# Patient Record
Sex: FEMALE | Race: WHITE | Employment: UNEMPLOYED | URBAN - METROPOLITAN AREA
[De-identification: names, ages, dates, MRNs, and addresses within clinical notes are randomized per-mention and may not be internally consistent; named-entity substitution may affect disease eponyms.]

---

## 2020-01-22 ENCOUNTER — HOSPITAL ENCOUNTER (OUTPATIENT)
Age: 55
Setting detail: SPECIMEN
Discharge: HOME | End: 2020-01-22
Payer: COMMERCIAL

## 2020-01-22 LAB
ALBUMIN SERPL-MCNC: 4.4 G/DL (ref 3.5–5.2)
ALBUMIN/GLOBULIN RATIO: 1.3 (ref 1–2.5)
ALP BLD-CCNC: 64 U/L (ref 35–104)
ALT SERPL-CCNC: 20 U/L (ref 5–33)
ANION GAP SERPL CALCULATED.3IONS-SCNC: 17 MMOL/L (ref 9–17)
AST SERPL-CCNC: 23 U/L
BILIRUB SERPL-MCNC: 0.22 MG/DL (ref 0.3–1.2)
BUN BLDV-MCNC: 19 MG/DL (ref 6–20)
BUN/CREAT BLD: ABNORMAL (ref 9–20)
CALCIUM SERPL-MCNC: 9.6 MG/DL (ref 8.6–10.4)
CHLORIDE BLD-SCNC: 100 MMOL/L (ref 98–107)
CHOLESTEROL, FASTING: 252 MG/DL
CHOLESTEROL/HDL RATIO: 4.1
CO2: 23 MMOL/L (ref 20–31)
CREAT SERPL-MCNC: 0.77 MG/DL (ref 0.5–0.9)
GFR AFRICAN AMERICAN: >60 ML/MIN
GFR NON-AFRICAN AMERICAN: >60 ML/MIN
GFR SERPL CREATININE-BSD FRML MDRD: ABNORMAL ML/MIN/{1.73_M2}
GFR SERPL CREATININE-BSD FRML MDRD: ABNORMAL ML/MIN/{1.73_M2}
GLUCOSE FASTING: 73 MG/DL (ref 70–99)
HDLC SERPL-MCNC: 62 MG/DL
LDL CHOLESTEROL: 164 MG/DL (ref 0–130)
POTASSIUM SERPL-SCNC: 4.2 MMOL/L (ref 3.7–5.3)
SODIUM BLD-SCNC: 140 MMOL/L (ref 135–144)
THYROXINE, FREE: 1.13 NG/DL (ref 0.93–1.7)
TOTAL PROTEIN: 7.7 G/DL (ref 6.4–8.3)
TRIGLYCERIDE, FASTING: 132 MG/DL
TSH SERPL DL<=0.05 MIU/L-ACNC: 2.97 MIU/L (ref 0.3–5)
VITAMIN D 25-HYDROXY: 32.9 NG/ML (ref 30–100)
VLDLC SERPL CALC-MCNC: ABNORMAL MG/DL (ref 1–30)

## 2023-04-15 ENCOUNTER — HOSPITAL ENCOUNTER (EMERGENCY)
Facility: CLINIC | Age: 58
Discharge: HOME | End: 2023-04-15
Attending: EMERGENCY MEDICINE
Payer: COMMERCIAL

## 2023-04-15 VITALS
HEIGHT: 65 IN | BODY MASS INDEX: 23.32 KG/M2 | SYSTOLIC BLOOD PRESSURE: 159 MMHG | WEIGHT: 140 LBS | TEMPERATURE: 98.2 F | RESPIRATION RATE: 16 BRPM | DIASTOLIC BLOOD PRESSURE: 88 MMHG | OXYGEN SATURATION: 97 % | HEART RATE: 75 BPM

## 2023-04-15 DIAGNOSIS — S61.412A LACERATION OF LEFT HAND WITHOUT FOREIGN BODY, INITIAL ENCOUNTER: Primary | ICD-10-CM

## 2023-04-15 PROCEDURE — 99282 EMERGENCY DEPT VISIT SF MDM: CPT

## 2023-04-15 PROCEDURE — 12001 RPR S/N/AX/GEN/TRNK 2.5CM/<: CPT

## 2023-04-15 ASSESSMENT — PAIN - FUNCTIONAL ASSESSMENT: PAIN_FUNCTIONAL_ASSESSMENT: NONE - DENIES PAIN

## 2023-12-18 ENCOUNTER — HOSPITAL ENCOUNTER (EMERGENCY)
Facility: CLINIC | Age: 58
Discharge: ANOTHER HOSPITAL | End: 2023-12-18
Attending: EMERGENCY MEDICINE
Payer: COMMERCIAL

## 2023-12-18 ENCOUNTER — APPOINTMENT (OUTPATIENT)
Dept: GENERAL RADIOLOGY | Facility: CLINIC | Age: 58
End: 2023-12-18
Payer: COMMERCIAL

## 2023-12-18 VITALS
HEART RATE: 93 BPM | RESPIRATION RATE: 19 BRPM | BODY MASS INDEX: 23.32 KG/M2 | SYSTOLIC BLOOD PRESSURE: 135 MMHG | DIASTOLIC BLOOD PRESSURE: 87 MMHG | OXYGEN SATURATION: 95 % | WEIGHT: 140 LBS | HEIGHT: 65 IN | TEMPERATURE: 97.6 F

## 2023-12-18 DIAGNOSIS — I21.4 NSTEMI (NON-ST ELEVATED MYOCARDIAL INFARCTION) (HCC): Primary | ICD-10-CM

## 2023-12-18 LAB
ALBUMIN SERPL-MCNC: 4.7 G/DL (ref 3.5–5.2)
ALBUMIN/GLOB SERPL: 1.5 {RATIO} (ref 1–2.5)
ALP SERPL-CCNC: 62 U/L (ref 35–104)
ALT SERPL-CCNC: 22 U/L (ref 5–33)
ANION GAP SERPL CALCULATED.3IONS-SCNC: 16 MMOL/L (ref 9–17)
AST SERPL-CCNC: 21 U/L
BASOPHILS # BLD: 0.1 K/UL (ref 0–0.2)
BASOPHILS NFR BLD: 1 % (ref 0–2)
BILIRUB SERPL-MCNC: 0.2 MG/DL (ref 0.3–1.2)
BUN SERPL-MCNC: 14 MG/DL (ref 6–20)
CALCIUM SERPL-MCNC: 9.8 MG/DL (ref 8.6–10.4)
CHLORIDE SERPL-SCNC: 102 MMOL/L (ref 98–107)
CO2 SERPL-SCNC: 21 MMOL/L (ref 20–31)
CREAT SERPL-MCNC: 0.7 MG/DL (ref 0.5–0.9)
D DIMER PPP FEU-MCNC: 0.24 UG/ML FEU
EOSINOPHIL # BLD: 0.2 K/UL (ref 0–0.4)
EOSINOPHILS RELATIVE PERCENT: 2 % (ref 1–4)
ERYTHROCYTE [DISTWIDTH] IN BLOOD BY AUTOMATED COUNT: 14.1 % (ref 12.5–15.4)
GFR SERPL CREATININE-BSD FRML MDRD: >60 ML/MIN/1.73M2
GLUCOSE SERPL-MCNC: 105 MG/DL (ref 70–99)
HCT VFR BLD AUTO: 37.4 % (ref 36–46)
HGB BLD-MCNC: 12.6 G/DL (ref 12–16)
LIPASE SERPL-CCNC: 44 U/L (ref 13–60)
LYMPHOCYTES NFR BLD: 3.2 K/UL (ref 1–4.8)
LYMPHOCYTES RELATIVE PERCENT: 39 % (ref 24–44)
MCH RBC QN AUTO: 28.5 PG (ref 26–34)
MCHC RBC AUTO-ENTMCNC: 33.6 G/DL (ref 31–37)
MCV RBC AUTO: 84.8 FL (ref 80–100)
MONOCYTES NFR BLD: 1 K/UL (ref 0.1–1.2)
MONOCYTES NFR BLD: 12 % (ref 2–11)
NEUTROPHILS NFR BLD: 46 % (ref 36–66)
NEUTS SEG NFR BLD: 3.7 K/UL (ref 1.8–7.7)
PARTIAL THROMBOPLASTIN TIME: 24.6 SEC (ref 21.3–31.3)
PLATELET # BLD AUTO: 330 K/UL (ref 140–450)
PMV BLD AUTO: 8.4 FL (ref 6–12)
POTASSIUM SERPL-SCNC: 3.1 MMOL/L (ref 3.7–5.3)
PROT SERPL-MCNC: 7.9 G/DL (ref 6.4–8.3)
RBC # BLD AUTO: 4.41 M/UL (ref 4–5.2)
SODIUM SERPL-SCNC: 139 MMOL/L (ref 135–144)
TROPONIN I SERPL HS-MCNC: 276 NG/L (ref 0–14)
TROPONIN I SERPL HS-MCNC: 397 NG/L (ref 0–14)
TROPONIN I SERPL HS-MCNC: 66 NG/L (ref 0–14)
WBC OTHER # BLD: 8.1 K/UL (ref 3.5–11)

## 2023-12-18 PROCEDURE — 83690 ASSAY OF LIPASE: CPT

## 2023-12-18 PROCEDURE — 85379 FIBRIN DEGRADATION QUANT: CPT

## 2023-12-18 PROCEDURE — A4216 STERILE WATER/SALINE, 10 ML: HCPCS

## 2023-12-18 PROCEDURE — 96365 THER/PROPH/DIAG IV INF INIT: CPT

## 2023-12-18 PROCEDURE — 6360000002 HC RX W HCPCS

## 2023-12-18 PROCEDURE — 6360000002 HC RX W HCPCS: Performed by: EMERGENCY MEDICINE

## 2023-12-18 PROCEDURE — 96375 TX/PRO/DX INJ NEW DRUG ADDON: CPT

## 2023-12-18 PROCEDURE — 96376 TX/PRO/DX INJ SAME DRUG ADON: CPT

## 2023-12-18 PROCEDURE — 71046 X-RAY EXAM CHEST 2 VIEWS: CPT

## 2023-12-18 PROCEDURE — 36415 COLL VENOUS BLD VENIPUNCTURE: CPT

## 2023-12-18 PROCEDURE — 84484 ASSAY OF TROPONIN QUANT: CPT

## 2023-12-18 PROCEDURE — 2500000003 HC RX 250 WO HCPCS

## 2023-12-18 PROCEDURE — 85025 COMPLETE CBC W/AUTO DIFF WBC: CPT

## 2023-12-18 PROCEDURE — 6370000000 HC RX 637 (ALT 250 FOR IP)

## 2023-12-18 PROCEDURE — 85730 THROMBOPLASTIN TIME PARTIAL: CPT

## 2023-12-18 PROCEDURE — 93005 ELECTROCARDIOGRAM TRACING: CPT

## 2023-12-18 PROCEDURE — 80053 COMPREHEN METABOLIC PANEL: CPT

## 2023-12-18 PROCEDURE — 2580000003 HC RX 258

## 2023-12-18 PROCEDURE — 96368 THER/DIAG CONCURRENT INF: CPT

## 2023-12-18 PROCEDURE — 99285 EMERGENCY DEPT VISIT HI MDM: CPT

## 2023-12-18 PROCEDURE — 96366 THER/PROPH/DIAG IV INF ADDON: CPT

## 2023-12-18 RX ORDER — PROCHLORPERAZINE EDISYLATE 5 MG/ML
10 INJECTION INTRAMUSCULAR; INTRAVENOUS ONCE
Status: COMPLETED | OUTPATIENT
Start: 2023-12-18 | End: 2023-12-18

## 2023-12-18 RX ORDER — ASPIRIN 81 MG/1
324 TABLET, CHEWABLE ORAL ONCE
Status: COMPLETED | OUTPATIENT
Start: 2023-12-18 | End: 2023-12-18

## 2023-12-18 RX ORDER — MORPHINE SULFATE 4 MG/ML
4 INJECTION, SOLUTION INTRAMUSCULAR; INTRAVENOUS ONCE
Status: COMPLETED | OUTPATIENT
Start: 2023-12-18 | End: 2023-12-18

## 2023-12-18 RX ORDER — ESTRADIOL 1 MG/1
1 TABLET ORAL DAILY
Status: ON HOLD | COMMUNITY
Start: 2023-12-10 | End: 2023-12-20 | Stop reason: HOSPADM

## 2023-12-18 RX ORDER — POTASSIUM CHLORIDE 20 MEQ/1
40 TABLET, EXTENDED RELEASE ORAL ONCE
Status: COMPLETED | OUTPATIENT
Start: 2023-12-18 | End: 2023-12-18

## 2023-12-18 RX ORDER — ONDANSETRON 2 MG/ML
4 INJECTION INTRAMUSCULAR; INTRAVENOUS ONCE
Status: COMPLETED | OUTPATIENT
Start: 2023-12-18 | End: 2023-12-18

## 2023-12-18 RX ORDER — ATORVASTATIN CALCIUM 20 MG/1
20 TABLET, FILM COATED ORAL DAILY
Status: ON HOLD | COMMUNITY
Start: 2023-12-10 | End: 2023-12-20 | Stop reason: HOSPADM

## 2023-12-18 RX ORDER — HEPARIN SODIUM 10000 [USP'U]/100ML
5-30 INJECTION, SOLUTION INTRAVENOUS CONTINUOUS
Status: DISCONTINUED | OUTPATIENT
Start: 2023-12-18 | End: 2023-12-18 | Stop reason: HOSPADM

## 2023-12-18 RX ORDER — HEPARIN SODIUM 1000 [USP'U]/ML
60 INJECTION, SOLUTION INTRAVENOUS; SUBCUTANEOUS ONCE
Status: COMPLETED | OUTPATIENT
Start: 2023-12-18 | End: 2023-12-18

## 2023-12-18 RX ORDER — NITROGLYCERIN 20 MG/100ML
5-200 INJECTION INTRAVENOUS CONTINUOUS
Status: DISCONTINUED | OUTPATIENT
Start: 2023-12-18 | End: 2023-12-18 | Stop reason: HOSPADM

## 2023-12-18 RX ORDER — NITROGLYCERIN 0.4 MG/1
0.4 TABLET SUBLINGUAL EVERY 5 MIN PRN
Status: DISCONTINUED | OUTPATIENT
Start: 2023-12-18 | End: 2023-12-18 | Stop reason: HOSPADM

## 2023-12-18 RX ORDER — ACETAMINOPHEN 500 MG
1000 TABLET ORAL ONCE
Status: COMPLETED | OUTPATIENT
Start: 2023-12-18 | End: 2023-12-18

## 2023-12-18 RX ADMIN — Medication 0.4 MG: at 19:51

## 2023-12-18 RX ADMIN — NITROGLYCERIN 5 MCG/MIN: 20 INJECTION INTRAVENOUS at 20:35

## 2023-12-18 RX ADMIN — ONDANSETRON 4 MG: 2 INJECTION INTRAMUSCULAR; INTRAVENOUS at 18:40

## 2023-12-18 RX ADMIN — ONDANSETRON 4 MG: 2 INJECTION INTRAMUSCULAR; INTRAVENOUS at 21:59

## 2023-12-18 RX ADMIN — ASPIRIN 81 MG CHEWABLE TABLET 324 MG: 81 TABLET CHEWABLE at 19:18

## 2023-12-18 RX ADMIN — MORPHINE SULFATE 4 MG: 4 INJECTION, SOLUTION INTRAMUSCULAR; INTRAVENOUS at 18:39

## 2023-12-18 RX ADMIN — HEPARIN SODIUM 12 UNITS/KG/HR: 10000 INJECTION, SOLUTION INTRAVENOUS at 20:48

## 2023-12-18 RX ADMIN — FAMOTIDINE 20 MG: 10 INJECTION, SOLUTION INTRAVENOUS at 18:40

## 2023-12-18 RX ADMIN — POTASSIUM CHLORIDE 40 MEQ: 1500 TABLET, EXTENDED RELEASE ORAL at 19:18

## 2023-12-18 RX ADMIN — MORPHINE SULFATE 4 MG: 4 INJECTION, SOLUTION INTRAMUSCULAR; INTRAVENOUS at 21:43

## 2023-12-18 RX ADMIN — HEPARIN SODIUM 3810 UNITS: 1000 INJECTION INTRAVENOUS; SUBCUTANEOUS at 20:49

## 2023-12-18 RX ADMIN — PROCHLORPERAZINE EDISYLATE 10 MG: 5 INJECTION INTRAMUSCULAR; INTRAVENOUS at 22:30

## 2023-12-18 RX ADMIN — ACETAMINOPHEN 1000 MG: 500 TABLET ORAL at 19:51

## 2023-12-18 ASSESSMENT — PAIN SCALES - GENERAL
PAINLEVEL_OUTOF10: 7
PAINLEVEL_OUTOF10: 4
PAINLEVEL_OUTOF10: 1

## 2023-12-18 ASSESSMENT — PAIN DESCRIPTION - FREQUENCY: FREQUENCY: CONTINUOUS

## 2023-12-18 ASSESSMENT — PAIN DESCRIPTION - PAIN TYPE
TYPE: ACUTE PAIN
TYPE: ACUTE PAIN

## 2023-12-18 ASSESSMENT — PAIN DESCRIPTION - ONSET: ONSET: ON-GOING

## 2023-12-18 ASSESSMENT — PAIN DESCRIPTION - LOCATION
LOCATION: CHEST
LOCATION: CHEST

## 2023-12-18 ASSESSMENT — PAIN DESCRIPTION - DESCRIPTORS: DESCRIPTORS: TIGHTNESS

## 2023-12-18 ASSESSMENT — LIFESTYLE VARIABLES
HOW MANY STANDARD DRINKS CONTAINING ALCOHOL DO YOU HAVE ON A TYPICAL DAY: PATIENT DOES NOT DRINK
HOW OFTEN DO YOU HAVE A DRINK CONTAINING ALCOHOL: NEVER

## 2023-12-18 ASSESSMENT — PAIN DESCRIPTION - ORIENTATION: ORIENTATION: MID

## 2023-12-18 ASSESSMENT — PAIN - FUNCTIONAL ASSESSMENT
PAIN_FUNCTIONAL_ASSESSMENT: 0-10
PAIN_FUNCTIONAL_ASSESSMENT: ACTIVITIES ARE NOT PREVENTED

## 2023-12-18 NOTE — ED PROVIDER NOTES
[facility] ED      Pt Name: Betsey Kate  MRN: [id number]  [address] [facility] [place] 1965  Date of evaluation: 12/18/2023    EMERGENCY DEPARTMENT ENCOUNTER      PERTINENT ATTENDING PHYSICIAN COMMENTS:      Faculty Attestation    I performed a history and physical examination of the patient and discussed management with the mid level provideer. I reviewed the mid level provider's note and agree with the documented findings and plan of care. Any areas of disagreement are noted on the chart. I was personally present for the key portions of any procedures. I have documented in the chart those procedures where I was not present during the key portions. I have reviewed the emergency nurses triage note. I agree with the chief complaint, past medical history, past surgical history, allergies, medications, social and family history as documented unless otherwise noted below. Documentation of the HPI, Physical Exam and Medical Decision Making performed by medical students or scribes is based on my personal performance of the HPI, PE and MDM. For Residents/Physician Assistant/ Nurse Practitioner cases/documentation I have personally evaluated this patient and have completed at least one if not all key elements of the E/M (history, physical exam, and MDM). Additional findings are as noted. CHIEF COMPLAINT       Chief Complaint   Patient presents with    Chest Pain    Dizziness       HISTORY OF PRESENT ILLNESS    Betsey Kate is a 61 y.o. female who presents to the emergency department with chest pressure and tightness with radiation to her left side of her neck and left upper chest starting 1 hour prior to arrival.  Rates it 4/10. No fevers chills cough or congestion. She describes it as an uncomfortable sensation but nothing more than that. She denies any recent travel. No known cardiac disease. No strong family history of cardiac disease. No other symptoms.   Patient was returning from a visit to her mom and had   -------------------------  BP (!) 184/102   Pulse 88   Temp 97.6 °F (36.4 °C) (Oral)   Resp 22   Ht 1.651 m (5' 5\")   Wt 63.5 kg (140 lb)   SpO2 100%   BMI 23.30 kg/m²     7:15 PM supervisory care will be transferred to Dr. Lala     CRITICAL CARE:    None    PROCEDURES:    See midlevel documentation      OARRS Report if indicated         DISPOSITION        (Please note that portions of this note were completed with a voice recognition program.  Efforts were made to edit the dictations but occasionally words are mis-transcribed.  Additionally, portions of this note may also include information that was incorporated after care transfer to another provider that were not available at the time of my evaluation.  Some of this information could likely include laboratory values, vital sign updates, medications etc.)    Edinson Machuca DO   Attending Emergency Physician       Edinson Machuca DO  12/18/23 8562

## 2023-12-18 NOTE — ED PROVIDER NOTES
[facility]  EMERGENCY DEPARTMENT ENCOUNTER      Pt Name: Brent Ness  MRN: [id number]  Birthdate 1965  Date of evaluation: 12/18/2023  Provider: ELISA Ramos CNP  9:34 PM    CHIEF COMPLAINT       Chief Complaint   Patient presents with    Chest Pain    Dizziness         HISTORY OF PRESENT ILLNESS    Brent Ness is a 57 y.o. female who presents to the emergency department with anterior chest pain x 1 hour     HPI  This is a 57-year-old female who presents with central chest pressure and tightness for the last hour.  She is also feeling anxious and shaking.  She denies any shortness of breath there is no abdominal pain, nausea vomiting or diarrhea.  She has not had a fever she has not been sick with a cough cold or congestion.  She does not have any history of heart problems.  Pain has been constant for an hour pain radiates up into her throat.  Patient denies any alcohol or drug use/abuse.    Nursing Notes were reviewed.    REVIEW OF SYSTEMS       Review of Systems   Constitutional:  Negative for activity change, chills, fatigue and fever.   HENT:  Negative for congestion, ear pain, rhinorrhea and sore throat.    Respiratory:  Negative for cough and shortness of breath.    Cardiovascular:  Positive for chest pain. Negative for leg swelling.   Gastrointestinal:  Negative for abdominal pain, diarrhea, nausea and vomiting.   Genitourinary:  Negative for difficulty urinating and pelvic pain.   Musculoskeletal:  Negative for arthralgias, joint swelling and myalgias.   Skin:  Negative for color change.   Neurological:  Negative for dizziness, numbness and headaches.   Psychiatric/Behavioral:  The patient is nervous/anxious.        Except as noted above the remainder of the review of systems was reviewed and negative.       PAST MEDICAL HISTORY   History reviewed. No pertinent past medical history.      SURGICAL HISTORY       Past Surgical History:   Procedure Laterality Date     this dictation. EMERGENCY DEPARTMENT COURSE and DIFFERENTIAL DIAGNOSIS/MDM:   Vitals:    Vitals:    12/18/23 2023 12/18/23 2055 12/18/23 2110 12/18/23 2128   BP: (!) 161/113 (!) 153/96 (!) 143/95 (!) 149/99   Pulse: 87 90 78 81   Resp: 21 16 17 17   Temp:       TempSrc:       SpO2: 99% 99% 96% 99%   Weight:       Height:               Medical Decision Making  Differential diagnosis: STEMI, NSTEMI, pulmonary embolism, anxiety    Patient is anxious and tearful on arrival she is having severe anterior chest pain that radiates to her neck. IV was established labs were obtained she has noted to have hypokalemia of 3.1 she was given oral potassium she was given morphine Pepcid and Zofran which did initially help with her pain however it did return she was then given Tylenol and nitro sublingual which also help with her pain. Initial troponin was elevated at 66 D-dimer was normal liver enzymes and lipase were normal CBC panel and other electrolytes were normal.  Cardiology was consulted nitro drip and heparin drip were started repeat cardiac enzyme and EKG were due at 2030. Plan is to reconsult with cardiology after second troponin. Patient will likely be transferred to [facility] either ER or interventional cardiology. Patient has a moderate heart risk score of 6. Cardiology recommendation will be to continue heparin and nitro drips admit under the hospitalist to [facility] and trend troponin. Amount and/or Complexity of Data Reviewed  Labs: ordered. Radiology: ordered. ECG/medicine tests: ordered. Risk  OTC drugs. Prescription drug management. REASSESSMENT     ED Course as of 12/18/23 2135   Mon Dec 18, 2023   1946 Patient is still having pain after the morphine, she will be given nitro and then Tylenol plan is to consult with [facility] cardiology for possible transfer for NSTEMI.   Repeat EKG and troponin are due at 2030 [NK]   2025 Discussed case with cardiology Dr. Magnus Camacho, whose recommendation

## 2023-12-19 PROBLEM — I24.9 ACS (ACUTE CORONARY SYNDROME) (HCC): Status: ACTIVE | Noted: 2023-12-18

## 2023-12-19 PROBLEM — E78.5 DYSLIPIDEMIA: Status: ACTIVE | Noted: 2023-12-19

## 2023-12-19 PROBLEM — R11.2 NAUSEA & VOMITING: Status: ACTIVE | Noted: 2023-12-19

## 2023-12-19 PROBLEM — E87.6 HYPOKALEMIA: Status: ACTIVE | Noted: 2023-12-19

## 2023-12-19 LAB
EKG ATRIAL RATE: 77 BPM
EKG ATRIAL RATE: 92 BPM
EKG P AXIS: -8 DEGREES
EKG P-R INTERVAL: 136 MS
EKG P-R INTERVAL: 150 MS
EKG Q-T INTERVAL: 386 MS
EKG Q-T INTERVAL: 402 MS
EKG QRS DURATION: 86 MS
EKG QRS DURATION: 88 MS
EKG QTC CALCULATION (BAZETT): 454 MS
EKG QTC CALCULATION (BAZETT): 477 MS
EKG R AXIS: -9 DEGREES
EKG R AXIS: 46 DEGREES
EKG T AXIS: 76 DEGREES
EKG VENTRICULAR RATE: 77 BPM
EKG VENTRICULAR RATE: 92 BPM

## 2023-12-19 NOTE — ED NOTES
This RN at bedside due to pt call out for 1 episode of emesis  This RN offered pt some water/food, pt repots she is not feeling up to eating at this time  Will continue to monitor pt     Kelechi Hinkle RN  12/18/23 2232

## 2023-12-19 NOTE — ED PROVIDER NOTES
[facility] ED  [address]  Phone: [phone]        ADDENDUM:      Care of this patient was assumed from Dr. Florencia Yeager. The patient was seen for Chest Pain and Dizziness  . The patient's initial evaluation and plan have been discussed with the prior provider who initially evaluated the patient. Nursing Notes, Past Medical Hx, Past Surgical Hx, Allergies, were all reviewed. PAST MEDICAL HISTORY    has no past medical history on file. Patient denies    SURGICAL HISTORY      has a past surgical history that includes Appendectomy. CURRENT MEDICATIONS       Previous Medications    No medications on file       ALLERGIES     has No Known Allergies.       Diagnostic Results     LABS:   Results for orders placed or performed during the hospital encounter of 12/18/23   CBC with Auto Differential   Result Value Ref Range    WBC 8.1 3.5 - 11.0 k/uL    RBC 4.41 4.0 - 5.2 m/uL    Hemoglobin 12.6 12.0 - 16.0 g/dL    Hematocrit 37.4 36 - 46 %    MCV 84.8 80 - 100 fL    MCH 28.5 26 - 34 pg    MCHC 33.6 31 - 37 g/dL    RDW 14.1 12.5 - 15.4 %    Platelets 821 607 - 127 k/uL    MPV 8.4 6.0 - 12.0 fL    Neutrophils % 46 36 - 66 %    Lymphocytes % 39 24 - 44 %    Monocytes % 12 (H) 2 - 11 %    Eosinophils % 2 1 - 4 %    Basophils % 1 0 - 2 %    Neutrophils Absolute 3.70 1.8 - 7.7 k/uL    Lymphocytes Absolute 3.20 1.0 - 4.8 k/uL    Monocytes Absolute 1.00 0.1 - 1.2 k/uL    Eosinophils Absolute 0.20 0.0 - 0.4 k/uL    Basophils Absolute 0.10 0.0 - 0.2 k/uL   Troponin   Result Value Ref Range    Troponin, High Sensitivity 66 (HH) 0 - 14 ng/L   CMP   Result Value Ref Range    Sodium 139 135 - 144 mmol/L    Potassium 3.1 (L) 3.7 - 5.3 mmol/L    Chloride 102 98 - 107 mmol/L    CO2 21 20 - 31 mmol/L    Anion Gap 16 9 - 17 mmol/L    Glucose 105 (H) 70 - 99 mg/dL    BUN 14 6 - 20 mg/dL    Creatinine 0.7 0.5 - 0.9 mg/dL    Est, Glom Filt Rate >60 >60 mL/min/1.73m2    Calcium 9.8 8.6 - 10.4 mg/dL    Total Jose.    Repeat troponin increased to 256.  The patient is currently asymptomatic on heparin and nitro infusions.  We updated cardiology and they had us review the case and EKG's with the interventional cardiologist on call, Dr. Aly. He recommends continuing nitro and heparin and transferring to [place]. They do not plan on taking for cath tonight but will trend the troponins. We spoke to the [employer] MARI, [place] NP, at 9:32pm.  Plan to transfer to [place] via ground unit.     CRITICAL CARE: There was a high probability of clinically significant/life threatening deterioration in this patient's condition which required my urgent intervention.  Total critical care time was 45 minutes.  This excludes any time for separately reportable procedures.     Differential diagnosis considered: ACS/NSTEMI/STEMI/angina, arrhythmia, trauma, aortic dissection,  PE, PNA, pneumothroax, esophageal rupture, tamponade, Cocaine use, pneumonia, pericarditis, GERD, MSK, Endocarditis, anxiety     Chronic Conditions affecting care (DM,HTN,CA, etc): none    Social Determinants of Health affecting care (unable to care for self, lives alone, unemployed, homeless,etc): none    History source(s) (patient,spouse,parent,family,friend,EMS,etc): patient    Review of external sources (ECF,Hospital records,EMS report, radiology reports, etc): radiology reports, hospital records    Tests considered but not ordered: Not applicable    Independent interpretation of tests (eg.  X-ray, CAT scan, Doppler studies, EKG): EKG    Discussion of x-ray results with radiology: none    Consults: cardiology, interventional cardiology, hospitalist    Consideration for admission/observation (even if discharged): admission, transfer to [place]    Prescription considerations: n/a    Sepsis considered: low suspicion    Critical Care note written: yes      Disposition     FINAL IMPRESSION      1. NSTEMI (non-ST elevated myocardial infarction) (HCC)

## 2023-12-19 NOTE — ED NOTES
Pt presents to ED c/o chest pain and dizziness that started approximately 1 hour prior to her arrival in ED. Pt denies any cardiac or significant medical history. Pt reports that she has been very stressed at home and having to deal with her sick mother. Pt denies any history of anxiety but appears visibly anxious and is tearful and shaking. Pt reports pain as an intermittent tightness but denies SOB. Pt arrives A/Ox4, PWD, and hypertensive at 184/102. Pt resting on stretcher with call light in reach.      Severiano Lopez RN  12/18/23 0914

## 2023-12-19 NOTE — ED NOTES
Pt reports worsening chest pain, nitro gtt titrated per orders and will continue to monitor and reassess pt     [place] RN  12/18/23 2052

## 2023-12-19 NOTE — ED NOTES
Update report called to [facility]. Pain 1/10 at departure. Compazine given for nausea.      Hayley Louie RN  12/18/23 9483

## 2023-12-19 NOTE — ED NOTES
Report given to Cy Mathur RN at [facility], all questions answered  Pt to go to [facility] BED 2008  [address]       Amparo Bumpers, RN  12/18/23 6800

## 2023-12-19 NOTE — ED NOTES
7580 call to [facility]'s  advised Devora TRIPLETT 3rd troponin is 397.        Urvashi Nicholson RN  12/18/23 1536

## 2023-12-19 NOTE — ED NOTES
Critical repeat trop at 276 from lab. Dr. Sony Mcgrath and Yumiko Hansen notified.      Clair Mnan RN  12/18/23 0398

## 2023-12-20 PROBLEM — Z95.5 S/P PRIMARY ANGIOPLASTY WITH CORONARY STENT: Status: ACTIVE | Noted: 2023-12-20

## 2024-01-17 ENCOUNTER — HOSPITAL ENCOUNTER (OUTPATIENT)
Dept: CARDIAC REHAB | Age: 59
Setting detail: THERAPIES SERIES
Discharge: HOME | End: 2024-01-17
Attending: INTERNAL MEDICINE
Payer: COMMERCIAL

## 2024-01-17 VITALS
BODY MASS INDEX: 23.74 KG/M2 | SYSTOLIC BLOOD PRESSURE: 146 MMHG | WEIGHT: 147.7 LBS | HEIGHT: 66 IN | HEART RATE: 65 BPM | DIASTOLIC BLOOD PRESSURE: 84 MMHG | OXYGEN SATURATION: 99 %

## 2024-01-17 PROCEDURE — 93798 PHYS/QHP OP CAR RHAB W/ECG: CPT

## 2024-01-17 PROCEDURE — 93797 PHYS/QHP OP CAR RHAB WO ECG: CPT

## 2024-01-17 RX ORDER — PANTOPRAZOLE SODIUM 40 MG/10ML
40 INJECTION, POWDER, LYOPHILIZED, FOR SOLUTION INTRAVENOUS DAILY
COMMUNITY

## 2024-01-17 ASSESSMENT — EXERCISE STRESS TEST
PEAK_BP: 130/80
PEAK_RPE: 13
PEAK_BP: 130/80
PEAK_HR: 122
PEAK_METS: 6

## 2024-01-17 ASSESSMENT — PATIENT HEALTH QUESTIONNAIRE - PHQ9
SUM OF ALL RESPONSES TO PHQ QUESTIONS 1-9: 0
1. LITTLE INTEREST OR PLEASURE IN DOING THINGS: 0
SUM OF ALL RESPONSES TO PHQ9 QUESTIONS 1 & 2: 0
2. FEELING DOWN, DEPRESSED OR HOPELESS: 0
SUM OF ALL RESPONSES TO PHQ QUESTIONS 1-9: 0

## 2024-01-17 ASSESSMENT — EJECTION FRACTION: EF_VALUE: 45

## 2024-01-17 NOTE — CARDIO/PULMONARY
Pt oriented to Cardiac Rehab, goal set and ITP initiated. CAD Risk Factors and Prevention video viewed. Pt educated on Rate of Perceived Education scale. Consents signed.

## 2024-01-17 NOTE — FLOWSHEET NOTE
01/17/24 1214   Treatment Diagnosis   Treatment Diagnosis 1 NSTEMI   NSTEMI Date 12/19/23   Treatment Diagnosis 2 PCI   PCI Date 12/19/02   Referral Date 12/19/23   Oxygen Intervention   Oxygen Use No   O2 Sat Greater Than 90% Yes  (99%)   Individual Treatment Plan   ITP Visit Type Initial assessment   1st Date of Exercise  01/17/24   ITP Next Review Date 02/14/24   Visit #/Total Visits 1   EF% 45 %   Risk Stratification Moderate   ITP Exercise;Psychosocial;Nutrition;Education   Exercise    Mays Total Score 0   DASI Total Score 50.7   Stages of Change Preparation   Data Measured Before Walk   O2 Device Room air   Data Measured Immediately After Walk   O2 Saturation 99   Exercise Prescription   Mode Treadmill;Stepper;Ergometer;Bike   Frequency per week 3X/WEEK   Duration Per Session 31 MIN   Intensity METS       6  (2.8 NS)   RPE 13   Progression Increase time to 40 min, increase NS mets to 3.5, increase ERG to 20 watt.   Resistance Training Yes   Exercise Blood Pressures   Resting /80   Peak /80   Is BP WDL Yes   Exercise Activity at Home   Type None  (Recently bought a treadmill, has not used it yet. Does a lot of physical work in [place].)   Exercise Education   Education Self pulse;Exercise safety;Signs/symptoms to report;RPE scale;Equipment orientation;Warm up/cool down;Physically active   Exercise Target Goal   Target Goal(s) BP < 140/90 or < 130/80, if DM or CKD;Aerobic activity 30 + minutes/day  5 days/week   Psychosocial   Stages of Change Maintenance   PHQ-9 Total Score 0   Psychosocial Intervention   Interventions No intervention indicated   Currently Taking Psychotropic Meds No   Medication Changes No   Psychosocial Education   Education Coping techniques;Environmental triggers;Impact self care behaviors on health;Relaxation techniques;Signs/symptoms of depression   Psychosocial Target Goals   Target Goal(s) Assess presence or absence of depression using a valid screening tool;Demonstrates

## 2024-01-17 NOTE — DISCHARGE INSTRUCTIONS
Cardiac Rehabilitation: Care Instructions  Your Care Instructions    Cardiac rehabilitation is a program for people who have a heart problem, such as a heart attack, heart failure, or a heart valve disease. The program includes exercise, lifestyle changes, education, and emotional support. Cardiac rehab can help you improve the quality of your life through better overall health. It can help you lose weight and feel better about yourself.  On your cardiac rehab team, you may have your doctor, a nurse specialist, a dietitian, and a physical therapist. They will design your cardiac rehab program specifically for you. You will learn how to reduce your risk for heart problems, how to manage stress, and how to eat a heart-healthy diet. By the end of the program, you will be ready to maintain a healthier lifestyle on your own.  Follow-up care is a key part of your treatment and safety. Be sure to make and go to all appointments, and call your doctor if you are having problems. It's also a good idea to know your test results and keep a list of the medicines you take.  How can you care for yourself at home?  Take your medicines exactly as prescribed. Call your doctor if you think you are having a problem with your medicine. You will get more details on the specific medicines your doctor prescribes.  Weigh yourself every day if your doctor tells you to. Watch for sudden weight gain. Weigh yourself on the same scale with the same amount of clothing at the same time of day.  Plan your meals so that you are eating heart-healthy foods.  Eat a variety of foods daily. Fresh fruits and vegetables and whole-grains are good choices.  Limit your fat intake, especially saturated and trans fat.  Limit salt (sodium).  Increase fiber in your diet.  Limit alcohol.  Learn how to take your pulse so that you can track your heart rate during exercise.  Always check with your doctor before you begin a new exercise program.  Warm up before

## 2024-01-19 ENCOUNTER — HOSPITAL ENCOUNTER (OUTPATIENT)
Dept: CARDIAC REHAB | Age: 59
Setting detail: THERAPIES SERIES
Discharge: HOME | End: 2024-01-19
Attending: INTERNAL MEDICINE
Payer: COMMERCIAL

## 2024-01-19 VITALS — WEIGHT: 146.6 LBS | BODY MASS INDEX: 24.4 KG/M2

## 2024-01-19 PROCEDURE — 93798 PHYS/QHP OP CAR RHAB W/ECG: CPT

## 2024-01-19 ASSESSMENT — EXERCISE STRESS TEST
PEAK_BP: 130/70
PEAK_RPE: 13
PEAK_METS: 6.2
PEAK_HR: 106

## 2024-01-22 ENCOUNTER — HOSPITAL ENCOUNTER (OUTPATIENT)
Dept: CARDIAC REHAB | Age: 59
Setting detail: THERAPIES SERIES
Discharge: HOME | End: 2024-01-22
Attending: INTERNAL MEDICINE
Payer: COMMERCIAL

## 2024-01-22 VITALS — WEIGHT: 148.2 LBS | BODY MASS INDEX: 24.66 KG/M2

## 2024-01-22 PROCEDURE — 93798 PHYS/QHP OP CAR RHAB W/ECG: CPT

## 2024-01-22 ASSESSMENT — EXERCISE STRESS TEST
PEAK_BP: 130/80
PEAK_METS: 7.6
PEAK_HR: 106
PEAK_RPE: 14

## 2024-01-23 RX ORDER — AMLODIPINE BESYLATE 2.5 MG/1
2.5 TABLET ORAL DAILY
Qty: 30 TABLET | Refills: 3 | Status: SHIPPED | OUTPATIENT
Start: 2024-01-23 | End: 2024-01-24

## 2024-01-23 RX ORDER — AMLODIPINE BESYLATE 2.5 MG/1
2.5 TABLET ORAL DAILY
Qty: 30 TABLET | Refills: 3 | Status: SHIPPED | OUTPATIENT
Start: 2024-01-23 | End: 2024-01-23

## 2024-01-24 ENCOUNTER — HOSPITAL ENCOUNTER (OUTPATIENT)
Dept: CARDIAC REHAB | Age: 59
Setting detail: THERAPIES SERIES
Discharge: HOME | End: 2024-01-24
Attending: INTERNAL MEDICINE
Payer: COMMERCIAL

## 2024-01-26 ENCOUNTER — HOSPITAL ENCOUNTER (OUTPATIENT)
Dept: CARDIAC REHAB | Age: 59
Setting detail: THERAPIES SERIES
Discharge: HOME | End: 2024-01-26
Attending: INTERNAL MEDICINE
Payer: COMMERCIAL

## 2024-01-26 VITALS — BODY MASS INDEX: 24.45 KG/M2 | WEIGHT: 146.9 LBS

## 2024-01-26 PROCEDURE — 93798 PHYS/QHP OP CAR RHAB W/ECG: CPT

## 2024-01-26 ASSESSMENT — EXERCISE STRESS TEST
PEAK_HR: 116
PEAK_BP: 118/60
PEAK_RPE: 13
PEAK_METS: 7.6

## 2024-01-29 ENCOUNTER — HOSPITAL ENCOUNTER (OUTPATIENT)
Dept: CARDIAC REHAB | Age: 59
Setting detail: THERAPIES SERIES
Discharge: HOME | End: 2024-01-29
Attending: INTERNAL MEDICINE
Payer: COMMERCIAL

## 2024-01-29 VITALS — WEIGHT: 147.6 LBS | BODY MASS INDEX: 24.56 KG/M2

## 2024-01-29 PROCEDURE — 93798 PHYS/QHP OP CAR RHAB W/ECG: CPT

## 2024-01-29 ASSESSMENT — EXERCISE STRESS TEST
PEAK_HR: 103
PEAK_BP: 110/64
PEAK_RPE: 13
PEAK_METS: 7.6

## 2024-01-31 ENCOUNTER — HOSPITAL ENCOUNTER (OUTPATIENT)
Dept: CARDIAC REHAB | Age: 59
Setting detail: THERAPIES SERIES
Discharge: HOME | End: 2024-01-31
Attending: INTERNAL MEDICINE
Payer: COMMERCIAL

## 2024-01-31 VITALS — WEIGHT: 149 LBS | BODY MASS INDEX: 24.79 KG/M2

## 2024-01-31 PROCEDURE — 93798 PHYS/QHP OP CAR RHAB W/ECG: CPT

## 2024-01-31 ASSESSMENT — EXERCISE STRESS TEST
PEAK_BP: 122/62
PEAK_RPE: 13
PEAK_HR: 122
PEAK_METS: 7.6

## 2024-02-02 ENCOUNTER — APPOINTMENT (OUTPATIENT)
Dept: CARDIAC REHAB | Age: 59
End: 2024-02-02
Attending: INTERNAL MEDICINE
Payer: COMMERCIAL

## 2024-02-05 ENCOUNTER — HOSPITAL ENCOUNTER (OUTPATIENT)
Dept: CARDIAC REHAB | Age: 59
Setting detail: THERAPIES SERIES
Discharge: HOME | End: 2024-02-05
Attending: INTERNAL MEDICINE
Payer: COMMERCIAL

## 2024-02-05 VITALS — BODY MASS INDEX: 24.65 KG/M2 | WEIGHT: 148.1 LBS

## 2024-02-05 PROCEDURE — 93798 PHYS/QHP OP CAR RHAB W/ECG: CPT

## 2024-02-05 ASSESSMENT — EXERCISE STRESS TEST
PEAK_RPE: 13
PEAK_BP: 138/60
PEAK_METS: 7.7
PEAK_HR: 140

## 2024-02-05 NOTE — CARDIO/PULMONARY
Patient states \"she has not been taking her lopressor since last Saturday, 2/3.\" Patient states the medication is effecting her daily routines. She is having to rest to gain energy to accomplish daily living activities.\"

## 2024-02-07 ENCOUNTER — HOSPITAL ENCOUNTER (OUTPATIENT)
Dept: CARDIAC REHAB | Age: 59
Setting detail: THERAPIES SERIES
Discharge: HOME | End: 2024-02-07
Attending: INTERNAL MEDICINE
Payer: COMMERCIAL

## 2024-02-07 VITALS — WEIGHT: 146.6 LBS | BODY MASS INDEX: 24.4 KG/M2

## 2024-02-07 PROCEDURE — 93798 PHYS/QHP OP CAR RHAB W/ECG: CPT

## 2024-02-07 ASSESSMENT — EXERCISE STRESS TEST
PEAK_HR: 140
PEAK_METS: 7.6
PEAK_BP: 134/68
PEAK_RPE: 13

## 2024-02-09 ENCOUNTER — HOSPITAL ENCOUNTER (OUTPATIENT)
Dept: CARDIAC REHAB | Age: 59
Setting detail: THERAPIES SERIES
Discharge: HOME | End: 2024-02-09
Attending: INTERNAL MEDICINE
Payer: COMMERCIAL

## 2024-02-12 ENCOUNTER — HOSPITAL ENCOUNTER (OUTPATIENT)
Dept: CARDIAC REHAB | Age: 59
Setting detail: THERAPIES SERIES
Discharge: HOME | End: 2024-02-12
Attending: INTERNAL MEDICINE
Payer: COMMERCIAL

## 2024-02-12 VITALS — WEIGHT: 147.4 LBS | BODY MASS INDEX: 24.53 KG/M2

## 2024-02-12 PROCEDURE — 93798 PHYS/QHP OP CAR RHAB W/ECG: CPT

## 2024-02-14 ENCOUNTER — HOSPITAL ENCOUNTER (OUTPATIENT)
Dept: CARDIAC REHAB | Age: 59
Setting detail: THERAPIES SERIES
Discharge: HOME | End: 2024-02-14
Attending: INTERNAL MEDICINE
Payer: COMMERCIAL

## 2024-02-14 VITALS — BODY MASS INDEX: 24.65 KG/M2 | WEIGHT: 148.1 LBS

## 2024-02-14 PROCEDURE — 93798 PHYS/QHP OP CAR RHAB W/ECG: CPT

## 2024-02-14 NOTE — FLOWSHEET NOTE
Goal(s) Demonstrates appropriate interaction with others;Engages in self-care behaviors;Maximizes coping skills   Uses Stress Mgmt Techniques Yes   Nutrition   Stages of Change Action  (Uses fresh/frozen vegetables, oat milk instead of dairy.)   Diabetes No   Lipids   Date Lipids Drawn 12/19/24   Total 195   HDL 84   LDL 95   Triglycerides 81   Lipid Med(s) lipitor   Lipid Med Change(s) No   Weight Management   Weight  67.2 kg (148 lb 1.6 oz)   Height  1.664 m (5' 5.5\")   BMI 24.32   Alcohol None   Nutrition Intervention   Dietitian Consult No   Nurse/Patient Discussion Yes  ('Eating heart healthy\")   Diabetes Education Referral No   Lipid Clinic Referral No   Weight Management Referral No   Nutrition Education   Education Low saturated fat diet;Low sodium diet;Limit added sugars;Overall healthy eating pattern that emphasizes vegetables, fruits, wholegrains, healthy proteins and non-tropical oils;Reduced calorie/portion controlled diet   Nutrition Target Goals   Target Goals LDL-C less than 70 and non-HDL-C <100 for those with ASCVD;Triglycerides less than 150   Education   Learning Barrier Ready to learn   Education Intervention   Education Schedule Given Yes   Patient Education    Education CAD;Risk factors;Med compliance;Cardiac A&P;Signs/Symptoms of angina   Hypertension Yes   Hypertension Controlled Yes   Is BP WDL Yes   Med(s) Change No   BP Meds see MAR   ACE/ARB Prescribed No   ASA Prescribed Yes   ASA Adherent Yes   Antiplatelet Prescribed Yes   Antiplatelet Adherent Yes   BB Prescribed Yes   BB Adherent Yes   Statins Prescribed Yes   Statins Adherent Yes   Statin Intensity High   Education Target Goals   Target Goals Medication compliance;Risk factors;Understand target guidelines for lipids;Understand target guidelines for B/P   Staff Treatment Goals   Goals Functional capacity   Functional Capacity Goal \"Reduce SOB\"  (Still short of breath and plans on calling cardiology today and discuss brilinta)

## 2024-02-16 ENCOUNTER — HOSPITAL ENCOUNTER (OUTPATIENT)
Dept: CARDIAC REHAB | Age: 59
Setting detail: THERAPIES SERIES
Discharge: HOME | End: 2024-02-16
Attending: INTERNAL MEDICINE
Payer: COMMERCIAL

## 2024-02-19 ENCOUNTER — HOSPITAL ENCOUNTER (OUTPATIENT)
Dept: CARDIAC REHAB | Age: 59
Setting detail: THERAPIES SERIES
Discharge: HOME | End: 2024-02-19
Attending: INTERNAL MEDICINE
Payer: COMMERCIAL

## 2024-02-19 VITALS — WEIGHT: 148.5 LBS | BODY MASS INDEX: 24.71 KG/M2

## 2024-02-19 PROCEDURE — 93798 PHYS/QHP OP CAR RHAB W/ECG: CPT

## 2024-02-19 PROCEDURE — 93797 PHYS/QHP OP CAR RHAB WO ECG: CPT

## 2024-02-19 ASSESSMENT — EXERCISE STRESS TEST
PEAK_RPE: 13
PEAK_METS: 8.1
PEAK_BP: 132/74
PEAK_HR: 146

## 2024-02-19 NOTE — CARDIO/PULMONARY
Pt complains of some SOB while on walks at home and with incline or stairs.  HR reaching max on treadmill and slow to return to resting when exercise complete. Pt will be calling the office to discuss or set up an appt. ECG Stips available in epic

## 2024-02-21 ENCOUNTER — HOSPITAL ENCOUNTER (OUTPATIENT)
Dept: CARDIAC REHAB | Age: 59
Setting detail: THERAPIES SERIES
Discharge: HOME | End: 2024-02-21
Attending: INTERNAL MEDICINE
Payer: COMMERCIAL

## 2024-02-21 VITALS — WEIGHT: 148.5 LBS | BODY MASS INDEX: 24.71 KG/M2

## 2024-02-21 PROCEDURE — 93798 PHYS/QHP OP CAR RHAB W/ECG: CPT

## 2024-02-21 ASSESSMENT — EXERCISE STRESS TEST
PEAK_BP: 132/64
PEAK_HR: 122
PEAK_METS: 7.4
PEAK_RPE: 13

## 2024-02-21 NOTE — CARDIO/PULMONARY
Patient was seen per cardiology and her entresto was d/c'd, started on Metoprolol 12.5mg 2 times a day and lipitor decreased to 40 mg daily. She was also instructed \"not to hike yet.\"

## 2024-02-23 ENCOUNTER — HOSPITAL ENCOUNTER (OUTPATIENT)
Dept: CARDIAC REHAB | Age: 59
Setting detail: THERAPIES SERIES
Discharge: HOME | End: 2024-02-23
Attending: INTERNAL MEDICINE
Payer: COMMERCIAL

## 2024-02-26 ENCOUNTER — HOSPITAL ENCOUNTER (OUTPATIENT)
Dept: CARDIAC REHAB | Age: 59
Setting detail: THERAPIES SERIES
Discharge: HOME | End: 2024-02-26
Attending: INTERNAL MEDICINE
Payer: COMMERCIAL

## 2024-02-28 ENCOUNTER — HOSPITAL ENCOUNTER (OUTPATIENT)
Dept: CARDIAC REHAB | Age: 59
Setting detail: THERAPIES SERIES
Discharge: HOME | End: 2024-02-28
Attending: INTERNAL MEDICINE
Payer: COMMERCIAL

## 2024-03-04 ENCOUNTER — HOSPITAL ENCOUNTER (OUTPATIENT)
Dept: CARDIAC REHAB | Age: 59
Setting detail: THERAPIES SERIES
Discharge: HOME | End: 2024-03-04
Attending: INTERNAL MEDICINE

## 2024-03-06 ENCOUNTER — HOSPITAL ENCOUNTER (OUTPATIENT)
Dept: CARDIAC REHAB | Age: 59
Setting detail: THERAPIES SERIES
Discharge: HOME | End: 2024-03-06
Attending: INTERNAL MEDICINE

## 2024-03-06 NOTE — CARDIO/PULMONARY
Pt called to say she is not feeling that well and that she will no longer be participating in Cardiac Rehab. Remaining visits canceled.

## 2024-05-01 ENCOUNTER — HOSPITAL ENCOUNTER (EMERGENCY)
Facility: CLINIC | Age: 59
Discharge: ANOTHER HOSPITAL | End: 2024-05-01
Attending: EMERGENCY MEDICINE
Payer: COMMERCIAL

## 2024-05-01 ENCOUNTER — APPOINTMENT (OUTPATIENT)
Dept: CT IMAGING | Facility: CLINIC | Age: 59
End: 2024-05-01
Payer: COMMERCIAL

## 2024-05-01 VITALS
HEIGHT: 65 IN | WEIGHT: 143 LBS | TEMPERATURE: 98.1 F | SYSTOLIC BLOOD PRESSURE: 167 MMHG | BODY MASS INDEX: 23.82 KG/M2 | HEART RATE: 96 BPM | DIASTOLIC BLOOD PRESSURE: 67 MMHG | OXYGEN SATURATION: 100 % | RESPIRATION RATE: 18 BRPM

## 2024-05-01 DIAGNOSIS — R07.9 CHEST PAIN, UNSPECIFIED TYPE: ICD-10-CM

## 2024-05-01 DIAGNOSIS — D64.9 ANEMIA, UNSPECIFIED TYPE: Primary | ICD-10-CM

## 2024-05-01 LAB
ALBUMIN SERPL-MCNC: 4.4 G/DL (ref 3.5–5.2)
ALBUMIN/GLOB SERPL: 1.4 {RATIO} (ref 1–2.5)
ALP SERPL-CCNC: 51 U/L (ref 35–104)
ALT SERPL-CCNC: 15 U/L (ref 5–33)
ANION GAP SERPL CALCULATED.3IONS-SCNC: 10 MMOL/L (ref 9–17)
AST SERPL-CCNC: 16 U/L
BASOPHILS # BLD: 0 K/UL (ref 0–0.2)
BASOPHILS NFR BLD: 0 % (ref 0–2)
BILIRUB DIRECT SERPL-MCNC: <0.1 MG/DL
BILIRUB INDIRECT SERPL-MCNC: ABNORMAL MG/DL (ref 0–1)
BILIRUB SERPL-MCNC: 0.2 MG/DL (ref 0.3–1.2)
BUN SERPL-MCNC: 15 MG/DL (ref 6–20)
CALCIUM SERPL-MCNC: 9.3 MG/DL (ref 8.6–10.4)
CHLORIDE SERPL-SCNC: 104 MMOL/L (ref 98–107)
CO2 SERPL-SCNC: 24 MMOL/L (ref 20–31)
CREAT SERPL-MCNC: 0.8 MG/DL (ref 0.5–0.9)
D DIMER PPP FEU-MCNC: 0.88 UG/ML FEU
EKG ATRIAL RATE: 101 BPM
EKG P AXIS: 66 DEGREES
EKG P-R INTERVAL: 146 MS
EKG Q-T INTERVAL: 346 MS
EKG QRS DURATION: 82 MS
EKG QTC CALCULATION (BAZETT): 448 MS
EKG R AXIS: 47 DEGREES
EKG T AXIS: 56 DEGREES
EKG VENTRICULAR RATE: 101 BPM
EOSINOPHIL # BLD: 0.3 K/UL (ref 0–0.4)
EOSINOPHILS RELATIVE PERCENT: 5 % (ref 1–4)
ERYTHROCYTE [DISTWIDTH] IN BLOOD BY AUTOMATED COUNT: 19.8 % (ref 12.5–15.4)
GFR, ESTIMATED: 85 ML/MIN/1.73M2
GLUCOSE SERPL-MCNC: 98 MG/DL (ref 70–99)
HCT VFR BLD AUTO: 14.1 % (ref 36–46)
HGB BLD-MCNC: 4.1 G/DL (ref 12–16)
INR PPP: 0.9
LYMPHOCYTES NFR BLD: 1.5 K/UL (ref 1–4.8)
LYMPHOCYTES RELATIVE PERCENT: 25 % (ref 24–44)
MCH RBC QN AUTO: 15.6 PG (ref 26–34)
MCHC RBC AUTO-ENTMCNC: 28.8 G/DL (ref 31–37)
MCV RBC AUTO: 54.1 FL (ref 80–100)
MONOCYTES NFR BLD: 0.42 K/UL (ref 0.1–0.8)
MONOCYTES NFR BLD: 7 % (ref 1–7)
MORPHOLOGY: ABNORMAL
NEUTROPHILS NFR BLD: 63 % (ref 36–66)
NEUTS SEG NFR BLD: 3.78 K/UL (ref 1.8–7.7)
PARTIAL THROMBOPLASTIN TIME: 20.1 SEC (ref 21.3–31.3)
PLATELET # BLD AUTO: 469 K/UL (ref 140–450)
PMV BLD AUTO: 8 FL (ref 6–12)
POTASSIUM SERPL-SCNC: 4.2 MMOL/L (ref 3.7–5.3)
PROT SERPL-MCNC: 7.5 G/DL (ref 6.4–8.3)
PROTHROMBIN TIME: 9.7 SEC (ref 9.4–12.6)
RBC # BLD AUTO: 2.6 M/UL (ref 4–5.2)
SODIUM SERPL-SCNC: 138 MMOL/L (ref 135–144)
TROPONIN I SERPL HS-MCNC: 6 NG/L (ref 0–14)
WBC OTHER # BLD: 6 K/UL (ref 3.5–11)

## 2024-05-01 PROCEDURE — 2580000003 HC RX 258: Performed by: EMERGENCY MEDICINE

## 2024-05-01 PROCEDURE — 85025 COMPLETE CBC W/AUTO DIFF WBC: CPT

## 2024-05-01 PROCEDURE — 80048 BASIC METABOLIC PNL TOTAL CA: CPT

## 2024-05-01 PROCEDURE — 93005 ELECTROCARDIOGRAM TRACING: CPT | Performed by: REGISTERED NURSE

## 2024-05-01 PROCEDURE — 85730 THROMBOPLASTIN TIME PARTIAL: CPT

## 2024-05-01 PROCEDURE — 71260 CT THORAX DX C+: CPT

## 2024-05-01 PROCEDURE — 99285 EMERGENCY DEPT VISIT HI MDM: CPT

## 2024-05-01 PROCEDURE — 84484 ASSAY OF TROPONIN QUANT: CPT

## 2024-05-01 PROCEDURE — 6360000004 HC RX CONTRAST MEDICATION: Performed by: EMERGENCY MEDICINE

## 2024-05-01 PROCEDURE — 85610 PROTHROMBIN TIME: CPT

## 2024-05-01 PROCEDURE — 80076 HEPATIC FUNCTION PANEL: CPT

## 2024-05-01 PROCEDURE — 36415 COLL VENOUS BLD VENIPUNCTURE: CPT

## 2024-05-01 PROCEDURE — 85379 FIBRIN DEGRADATION QUANT: CPT

## 2024-05-01 RX ORDER — SODIUM CHLORIDE 0.9 % (FLUSH) 0.9 %
10 SYRINGE (ML) INJECTION PRN
Status: DISCONTINUED | OUTPATIENT
Start: 2024-05-01 | End: 2024-05-01 | Stop reason: HOSPADM

## 2024-05-01 RX ORDER — CLOPIDOGREL BISULFATE 75 MG/1
75 TABLET ORAL DAILY
COMMUNITY

## 2024-05-01 RX ORDER — 0.9 % SODIUM CHLORIDE 0.9 %
80 INTRAVENOUS SOLUTION INTRAVENOUS ONCE
Status: COMPLETED | OUTPATIENT
Start: 2024-05-01 | End: 2024-05-01

## 2024-05-01 RX ORDER — KETOROLAC TROMETHAMINE 30 MG/ML
30 INJECTION, SOLUTION INTRAMUSCULAR; INTRAVENOUS ONCE
Status: DISCONTINUED | OUTPATIENT
Start: 2024-05-01 | End: 2024-05-01

## 2024-05-01 RX ADMIN — IOPAMIDOL 80 ML: 755 INJECTION, SOLUTION INTRAVENOUS at 10:53

## 2024-05-01 RX ADMIN — SODIUM CHLORIDE, PRESERVATIVE FREE 10 ML: 5 INJECTION INTRAVENOUS at 10:53

## 2024-05-01 RX ADMIN — SODIUM CHLORIDE 80 ML: 9 INJECTION, SOLUTION INTRAVENOUS at 10:53

## 2024-05-01 ASSESSMENT — PAIN DESCRIPTION - FREQUENCY: FREQUENCY: CONTINUOUS

## 2024-05-01 ASSESSMENT — PAIN DESCRIPTION - ORIENTATION: ORIENTATION: LEFT

## 2024-05-01 ASSESSMENT — PAIN DESCRIPTION - DESCRIPTORS: DESCRIPTORS: ACHING

## 2024-05-01 ASSESSMENT — ENCOUNTER SYMPTOMS
ANAL BLEEDING: 0
COUGH: 0
SHORTNESS OF BREATH: 1
BLOOD IN STOOL: 0
NAUSEA: 0
DIARRHEA: 0
ABDOMINAL PAIN: 0
BACK PAIN: 0
VOMITING: 0

## 2024-05-01 ASSESSMENT — PAIN DESCRIPTION - LOCATION: LOCATION: CHEST

## 2024-05-01 ASSESSMENT — PAIN - FUNCTIONAL ASSESSMENT
PAIN_FUNCTIONAL_ASSESSMENT: ACTIVITIES ARE NOT PREVENTED
PAIN_FUNCTIONAL_ASSESSMENT: NONE - DENIES PAIN
PAIN_FUNCTIONAL_ASSESSMENT: 0-10

## 2024-05-01 ASSESSMENT — PAIN DESCRIPTION - PAIN TYPE: TYPE: ACUTE PAIN

## 2024-05-01 ASSESSMENT — PAIN SCALES - GENERAL: PAINLEVEL_OUTOF10: 3

## 2024-05-01 ASSESSMENT — PAIN DESCRIPTION - ONSET: ONSET: ON-GOING

## 2024-05-01 NOTE — ED NOTES
Pt presents to ED c/o chest pain/discomfort for the past few days. Pt reports history of MI back in December. Pt states she has also been feeling SOB with minimal exertion. Pt arrives A/Ox4, PWD, PMS intact. Pt resting on stretcher with call light in reach.

## 2024-05-01 NOTE — ED PROVIDER NOTES
[facility]  [address] [facility] [address]  Phone: [phone]      Attending Physician Attestation          CHIEF COMPLAINT       Chief Complaint   Patient presents with    Chest Pain       DIAGNOSTIC RESULTS     LABS:  Labs Reviewed   CBC WITH AUTO DIFFERENTIAL - Abnormal; Notable for the following components:       Result Value    RBC 2.60 (*)     Hemoglobin 4.1 (*)     Hematocrit 14.1 (*)     MCV 54.1 (*)     MCH 15.6 (*)     MCHC 28.8 (*)     RDW 19.8 (*)     Platelets 469 (*)     Eosinophils % 5 (*)     All other components within normal limits   HEPATIC FUNCTION PANEL - Abnormal; Notable for the following components:    Total Bilirubin 0.2 (*)     All other components within normal limits   APTT - Abnormal; Notable for the following components:    APTT 20.1 (*)     All other components within normal limits   BASIC METABOLIC PANEL   TROPONIN   D-DIMER, QUANTITATIVE   PROTIME-INR       All other labs were within normal range or not returned as of this dictation.    RADIOLOGY:  CT CHEST PULMONARY EMBOLISM W CONTRAST    (Results Pending)         EMERGENCY DEPARTMENT COURSE:   Vitals:    Vitals:    05/01/24 0957   BP: (!) 167/83   Pulse: (!) 106   Resp: 18   Temp: 98.1 °F (36.7 °C)   TempSrc: Oral   SpO2: 99%   Weight: 64.9 kg (143 lb)   Height: 1.651 m (5' 5\")     -------------------------  BP: (!) 167/83, Temp: 98.1 °F (36.7 °C), Pulse: (!) 106, Respirations: 18             PERTINENT ATTENDING PHYSICIAN COMMENTS:    I performed a history and physical examination of the patient and discussed management with the mid level provider. I reviewed the mid level provider's note and agree with the documented findings and plan of care. Any areas of disagreement are noted on the chart. I was personally present for the key portions of any procedures. I have documented in the chart those procedures where I was not present during the key portions. I have reviewed the emergency nurses triage note. I 
[id number]

## 2024-05-23 ENCOUNTER — HOSPITAL ENCOUNTER (OUTPATIENT)
Age: 59
Setting detail: SPECIMEN
Discharge: HOME | End: 2024-05-23

## 2024-05-23 LAB
HCT VFR BLD AUTO: 30.4 % (ref 36.3–47.1)
HGB BLD-MCNC: 8.9 G/DL (ref 11.9–15.1)

## 2024-06-05 ENCOUNTER — HOSPITAL ENCOUNTER (OUTPATIENT)
Age: 59
Setting detail: SPECIMEN
Discharge: HOME | End: 2024-06-05

## 2024-06-05 LAB
HCT VFR BLD AUTO: 30.6 % (ref 36.3–47.1)
HGB BLD-MCNC: 9.5 G/DL (ref 11.9–15.1)
IMM RETICS NFR: 23.4 % (ref 2.7–18.3)
IRON SATN MFR SERPL: 37 % (ref 20–55)
IRON SERPL-MCNC: 120 UG/DL (ref 37–145)
RETIC HEMOGLOBIN: 31.9 PG (ref 28.2–35.7)
RETICS # AUTO: 0.1 M/UL (ref 0.03–0.08)
RETICS/RBC NFR AUTO: 2.7 % (ref 0.5–1.9)
TIBC SERPL-MCNC: 326 UG/DL (ref 250–450)
UNSATURATED IRON BINDING CAPACITY: 206 UG/DL (ref 112–347)

## 2024-07-01 ENCOUNTER — HOSPITAL ENCOUNTER (OUTPATIENT)
Age: 59
Setting detail: SPECIMEN
Discharge: HOME | End: 2024-07-01

## 2024-07-01 LAB
HCT VFR BLD AUTO: 33.3 % (ref 36.3–47.1)
HGB BLD-MCNC: 10.5 G/DL (ref 11.9–15.1)

## 2024-07-31 ENCOUNTER — HOSPITAL ENCOUNTER (OUTPATIENT)
Age: 59
Setting detail: SPECIMEN
Discharge: HOME | End: 2024-07-31

## 2024-07-31 LAB
HCT VFR BLD AUTO: 35.1 % (ref 36.3–47.1)
HGB BLD-MCNC: 10.9 G/DL (ref 11.9–15.1)

## 2024-08-30 ENCOUNTER — HOSPITAL ENCOUNTER (OUTPATIENT)
Age: 59
Setting detail: SPECIMEN
Discharge: HOME | End: 2024-08-30

## 2024-08-30 LAB
HCT VFR BLD AUTO: 27.9 % (ref 36.3–47.1)
HGB BLD-MCNC: 8.4 G/DL (ref 11.9–15.1)

## 2024-09-04 ENCOUNTER — HOSPITAL ENCOUNTER (OUTPATIENT)
Age: 59
Setting detail: SPECIMEN
Discharge: HOME | End: 2024-09-04

## 2024-09-04 LAB
BASOPHILS # BLD: 0.06 K/UL (ref 0–0.2)
BASOPHILS NFR BLD: 1 % (ref 0–2)
EOSINOPHIL # BLD: 0.19 K/UL (ref 0–0.44)
EOSINOPHILS RELATIVE PERCENT: 3 % (ref 1–4)
ERYTHROCYTE [DISTWIDTH] IN BLOOD BY AUTOMATED COUNT: 13.6 % (ref 11.8–14.4)
HCT VFR BLD AUTO: 29.3 % (ref 36.3–47.1)
HGB BLD-MCNC: 8.8 G/DL (ref 11.9–15.1)
IMM GRANULOCYTES # BLD AUTO: <0.03 K/UL (ref 0–0.3)
IMM GRANULOCYTES NFR BLD: 0 %
IRON SATN MFR SERPL: 29 % (ref 20–55)
IRON SERPL-MCNC: 116 UG/DL (ref 37–145)
LYMPHOCYTES NFR BLD: 1.38 K/UL (ref 1.1–3.7)
LYMPHOCYTES RELATIVE PERCENT: 23 % (ref 24–43)
MCH RBC QN AUTO: 25.7 PG (ref 25.2–33.5)
MCHC RBC AUTO-ENTMCNC: 30 G/DL (ref 28.4–34.8)
MCV RBC AUTO: 85.4 FL (ref 82.6–102.9)
MONOCYTES NFR BLD: 0.45 K/UL (ref 0.1–1.2)
MONOCYTES NFR BLD: 8 % (ref 3–12)
NEUTROPHILS NFR BLD: 65 % (ref 36–65)
NEUTS SEG NFR BLD: 3.79 K/UL (ref 1.5–8.1)
NRBC BLD-RTO: 0 PER 100 WBC
PLATELET # BLD AUTO: 264 K/UL (ref 138–453)
PMV BLD AUTO: 10.3 FL (ref 8.1–13.5)
RBC # BLD AUTO: 3.43 M/UL (ref 3.95–5.11)
TIBC SERPL-MCNC: 407 UG/DL (ref 250–450)
UNSATURATED IRON BINDING CAPACITY: 291 UG/DL (ref 112–347)
WBC OTHER # BLD: 5.9 K/UL (ref 3.5–11.3)

## 2024-09-05 LAB — FERRITIN SERPL-MCNC: 7 NG/ML (ref 13–150)

## 2024-10-07 ENCOUNTER — HOSPITAL ENCOUNTER (OUTPATIENT)
Age: 59
Setting detail: SPECIMEN
Discharge: HOME | End: 2024-10-07

## 2024-10-07 LAB
HCT VFR BLD AUTO: 35.3 % (ref 36.3–47.1)
HGB BLD-MCNC: 10.3 G/DL (ref 11.9–15.1)

## 2024-11-19 ENCOUNTER — HOSPITAL ENCOUNTER (OUTPATIENT)
Age: 59
Setting detail: SPECIMEN
Discharge: HOME | End: 2024-11-19

## 2024-11-19 LAB
HGB BLD-MCNC: 11.2 G/DL (ref 11.9–15.1)
IMM RETICS NFR: 8.1 % (ref 2.7–18.3)
IRON SATN MFR SERPL: 34 % (ref 20–55)
IRON SERPL-MCNC: 117 UG/DL (ref 37–145)
RETIC HEMOGLOBIN: 31.6 PG (ref 28.2–35.7)
RETICS # AUTO: 0.06 M/UL (ref 0.03–0.08)
RETICS/RBC NFR AUTO: 1.5 % (ref 0.5–1.9)
TIBC SERPL-MCNC: 346 UG/DL (ref 250–450)
UNSATURATED IRON BINDING CAPACITY: 229 UG/DL (ref 112–347)

## 2024-12-17 ENCOUNTER — HOSPITAL ENCOUNTER (OUTPATIENT)
Age: 59
Setting detail: SPECIMEN
Discharge: HOME | End: 2024-12-17

## 2024-12-17 LAB
HCT VFR BLD AUTO: 35.5 % (ref 36.3–47.1)
HGB BLD-MCNC: 11.1 G/DL (ref 11.9–15.1)

## 2025-01-24 ENCOUNTER — HOSPITAL ENCOUNTER (OUTPATIENT)
Age: 60
Setting detail: SPECIMEN
Discharge: HOME | End: 2025-01-24

## 2025-01-24 LAB
HCT VFR BLD AUTO: 33.7 % (ref 36.3–47.1)
HGB BLD-MCNC: 10.6 G/DL (ref 11.9–15.1)

## 2025-03-05 ENCOUNTER — HOSPITAL ENCOUNTER (OUTPATIENT)
Age: 60
Setting detail: SPECIMEN
Discharge: HOME | End: 2025-03-05

## 2025-03-05 LAB
HCT VFR BLD AUTO: 35.9 % (ref 36.3–47.1)
HGB BLD-MCNC: 11.1 G/DL (ref 11.9–15.1)

## 2025-04-10 ENCOUNTER — HOSPITAL ENCOUNTER (OUTPATIENT)
Age: 60
Setting detail: SPECIMEN
Discharge: HOME | End: 2025-04-10

## 2025-04-10 LAB
HCT VFR BLD AUTO: 38.3 % (ref 36.3–47.1)
HGB BLD-MCNC: 11.8 G/DL (ref 11.9–15.1)

## 2025-05-02 ENCOUNTER — HOSPITAL ENCOUNTER (OUTPATIENT)
Age: 60
Setting detail: SPECIMEN
Discharge: HOME | End: 2025-05-02

## 2025-05-02 LAB
HCT VFR BLD AUTO: 38.7 % (ref 36.3–47.1)
HGB BLD-MCNC: 12.3 G/DL (ref 11.9–15.1)

## 2025-08-05 ENCOUNTER — APPOINTMENT (OUTPATIENT)
Dept: GENERAL RADIOLOGY | Facility: CLINIC | Age: 60
DRG: 282 | End: 2025-08-05
Payer: COMMERCIAL

## 2025-08-05 ENCOUNTER — HOSPITAL ENCOUNTER (INPATIENT)
Age: 60
LOS: 1 days | Discharge: HOME | DRG: 282 | End: 2025-08-06
Attending: EMERGENCY MEDICINE | Admitting: INTERNAL MEDICINE
Payer: COMMERCIAL

## 2025-08-05 DIAGNOSIS — I21.4 NSTEMI (NON-ST ELEVATED MYOCARDIAL INFARCTION) (HCC): ICD-10-CM

## 2025-08-05 DIAGNOSIS — R07.9 CHEST PAIN, UNSPECIFIED TYPE: Primary | ICD-10-CM

## 2025-08-05 LAB
ALBUMIN SERPL-MCNC: 4.4 G/DL (ref 3.5–5.2)
ALBUMIN/GLOB SERPL: 1.6 {RATIO}
ALP SERPL-CCNC: 52 U/L (ref 35–104)
ALT SERPL-CCNC: 22 U/L (ref 10–35)
ANION GAP SERPL CALCULATED.3IONS-SCNC: 12 MMOL/L (ref 9–16)
AST SERPL-CCNC: 25 U/L (ref 10–35)
BASOPHILS # BLD: 0.1 K/UL (ref 0–0.2)
BASOPHILS NFR BLD: 1 % (ref 0–2)
BILIRUB SERPL-MCNC: <0.2 MG/DL (ref 0–1.2)
BUN SERPL-MCNC: 16 MG/DL (ref 8–23)
CALCIUM SERPL-MCNC: 9.5 MG/DL (ref 8.6–10.4)
CHLORIDE SERPL-SCNC: 102 MMOL/L (ref 98–107)
CO2 SERPL-SCNC: 25 MMOL/L (ref 20–31)
CREAT SERPL-MCNC: 0.8 MG/DL (ref 0.5–0.9)
D DIMER PPP FEU-MCNC: 0.3 UG/ML FEU
EOSINOPHIL # BLD: 0.2 K/UL (ref 0–0.4)
EOSINOPHILS RELATIVE PERCENT: 3 % (ref 1–4)
ERYTHROCYTE [DISTWIDTH] IN BLOOD BY AUTOMATED COUNT: 14.4 % (ref 12.5–15.4)
GFR, ESTIMATED: 84 ML/MIN/1.73M2
GLUCOSE SERPL-MCNC: 112 MG/DL (ref 74–99)
HCT VFR BLD AUTO: 37.5 % (ref 36–46)
HGB BLD-MCNC: 12.9 G/DL (ref 12–16)
LIPASE SERPL-CCNC: 49 U/L (ref 13–60)
LYMPHOCYTES NFR BLD: 1.7 K/UL (ref 1–4.8)
LYMPHOCYTES RELATIVE PERCENT: 28 % (ref 24–44)
MAGNESIUM SERPL-MCNC: 2.1 MG/DL (ref 1.6–2.4)
MCH RBC QN AUTO: 29.7 PG (ref 26–34)
MCHC RBC AUTO-ENTMCNC: 34.5 G/DL (ref 31–37)
MCV RBC AUTO: 86.2 FL (ref 80–100)
MONOCYTES NFR BLD: 0.5 K/UL (ref 0.1–1.2)
MONOCYTES NFR BLD: 8 % (ref 2–11)
NEUTROPHILS NFR BLD: 60 % (ref 36–66)
NEUTS SEG NFR BLD: 3.8 K/UL (ref 1.8–7.7)
PLATELET # BLD AUTO: 233 K/UL (ref 140–450)
PMV BLD AUTO: 8.6 FL (ref 6–12)
POTASSIUM SERPL-SCNC: 4.1 MMOL/L (ref 3.7–5.3)
PROT SERPL-MCNC: 7.1 G/DL (ref 6.6–8.7)
RBC # BLD AUTO: 4.35 M/UL (ref 4–5.2)
SODIUM SERPL-SCNC: 139 MMOL/L (ref 136–145)
TROPONIN I SERPL HS-MCNC: 66 NG/L (ref 0–14)
TROPONIN I SERPL HS-MCNC: <6 NG/L (ref 0–14)
TROPONIN I SERPL HS-MCNC: <6 NG/L (ref 0–14)
WBC OTHER # BLD: 6.3 K/UL (ref 3.5–11)

## 2025-08-05 PROCEDURE — 85379 FIBRIN DEGRADATION QUANT: CPT

## 2025-08-05 PROCEDURE — 2500000003 HC RX 250 WO HCPCS: Performed by: NURSE PRACTITIONER

## 2025-08-05 PROCEDURE — 71045 X-RAY EXAM CHEST 1 VIEW: CPT

## 2025-08-05 PROCEDURE — 80053 COMPREHEN METABOLIC PANEL: CPT

## 2025-08-05 PROCEDURE — 99222 1ST HOSP IP/OBS MODERATE 55: CPT | Performed by: STUDENT IN AN ORGANIZED HEALTH CARE EDUCATION/TRAINING PROGRAM

## 2025-08-05 PROCEDURE — 84484 ASSAY OF TROPONIN QUANT: CPT

## 2025-08-05 PROCEDURE — 2060000000 HC ICU INTERMEDIATE R&B

## 2025-08-05 PROCEDURE — 99285 EMERGENCY DEPT VISIT HI MDM: CPT

## 2025-08-05 PROCEDURE — 6370000000 HC RX 637 (ALT 250 FOR IP): Performed by: EMERGENCY MEDICINE

## 2025-08-05 PROCEDURE — 83690 ASSAY OF LIPASE: CPT

## 2025-08-05 PROCEDURE — 83735 ASSAY OF MAGNESIUM: CPT

## 2025-08-05 PROCEDURE — 6370000000 HC RX 637 (ALT 250 FOR IP): Performed by: NURSE PRACTITIONER

## 2025-08-05 PROCEDURE — 36415 COLL VENOUS BLD VENIPUNCTURE: CPT

## 2025-08-05 PROCEDURE — 85025 COMPLETE CBC W/AUTO DIFF WBC: CPT

## 2025-08-05 PROCEDURE — 93005 ELECTROCARDIOGRAM TRACING: CPT | Performed by: EMERGENCY MEDICINE

## 2025-08-05 RX ORDER — ENOXAPARIN SODIUM 100 MG/ML
40 INJECTION SUBCUTANEOUS DAILY
Status: DISCONTINUED | OUTPATIENT
Start: 2025-08-06 | End: 2025-08-06

## 2025-08-05 RX ORDER — POTASSIUM CHLORIDE 1500 MG/1
40 TABLET, EXTENDED RELEASE ORAL PRN
Status: DISCONTINUED | OUTPATIENT
Start: 2025-08-05 | End: 2025-08-06 | Stop reason: HOSPADM

## 2025-08-05 RX ORDER — ACETAMINOPHEN 325 MG/1
650 TABLET ORAL EVERY 6 HOURS PRN
Status: DISCONTINUED | OUTPATIENT
Start: 2025-08-05 | End: 2025-08-06 | Stop reason: HOSPADM

## 2025-08-05 RX ORDER — NITROGLYCERIN 0.4 MG/1
0.4 TABLET SUBLINGUAL PRN
Status: DISCONTINUED | OUTPATIENT
Start: 2025-08-05 | End: 2025-08-05 | Stop reason: SDUPTHER

## 2025-08-05 RX ORDER — LISINOPRIL 10 MG/1
5 TABLET ORAL 2 TIMES DAILY
COMMUNITY

## 2025-08-05 RX ORDER — MAGNESIUM SULFATE IN WATER 40 MG/ML
2000 INJECTION, SOLUTION INTRAVENOUS PRN
Status: DISCONTINUED | OUTPATIENT
Start: 2025-08-05 | End: 2025-08-06 | Stop reason: HOSPADM

## 2025-08-05 RX ORDER — SODIUM CHLORIDE 0.9 % (FLUSH) 0.9 %
10 SYRINGE (ML) INJECTION PRN
Status: DISCONTINUED | OUTPATIENT
Start: 2025-08-05 | End: 2025-08-06 | Stop reason: HOSPADM

## 2025-08-05 RX ORDER — ACETAMINOPHEN 650 MG/1
650 SUPPOSITORY RECTAL EVERY 6 HOURS PRN
Status: DISCONTINUED | OUTPATIENT
Start: 2025-08-05 | End: 2025-08-06 | Stop reason: HOSPADM

## 2025-08-05 RX ORDER — ATORVASTATIN CALCIUM 40 MG/1
40 TABLET, FILM COATED ORAL NIGHTLY
Status: DISCONTINUED | OUTPATIENT
Start: 2025-08-05 | End: 2025-08-06 | Stop reason: HOSPADM

## 2025-08-05 RX ORDER — NITROGLYCERIN 0.4 MG/1
0.4 TABLET SUBLINGUAL EVERY 5 MIN PRN
Status: DISCONTINUED | OUTPATIENT
Start: 2025-08-05 | End: 2025-08-06 | Stop reason: HOSPADM

## 2025-08-05 RX ORDER — POTASSIUM CHLORIDE 7.45 MG/ML
10 INJECTION INTRAVENOUS PRN
Status: DISCONTINUED | OUTPATIENT
Start: 2025-08-05 | End: 2025-08-06 | Stop reason: HOSPADM

## 2025-08-05 RX ORDER — SODIUM CHLORIDE 0.9 % (FLUSH) 0.9 %
5-40 SYRINGE (ML) INJECTION EVERY 12 HOURS SCHEDULED
Status: DISCONTINUED | OUTPATIENT
Start: 2025-08-05 | End: 2025-08-06 | Stop reason: HOSPADM

## 2025-08-05 RX ORDER — ONDANSETRON 4 MG/1
4 TABLET, ORALLY DISINTEGRATING ORAL EVERY 8 HOURS PRN
Status: DISCONTINUED | OUTPATIENT
Start: 2025-08-05 | End: 2025-08-06 | Stop reason: HOSPADM

## 2025-08-05 RX ORDER — ONDANSETRON 2 MG/ML
4 INJECTION INTRAMUSCULAR; INTRAVENOUS EVERY 6 HOURS PRN
Status: DISCONTINUED | OUTPATIENT
Start: 2025-08-05 | End: 2025-08-06 | Stop reason: HOSPADM

## 2025-08-05 RX ORDER — ASPIRIN 81 MG/1
81 TABLET, CHEWABLE ORAL DAILY
Status: DISCONTINUED | OUTPATIENT
Start: 2025-08-06 | End: 2025-08-06 | Stop reason: HOSPADM

## 2025-08-05 RX ORDER — SODIUM CHLORIDE 9 MG/ML
INJECTION, SOLUTION INTRAVENOUS PRN
Status: DISCONTINUED | OUTPATIENT
Start: 2025-08-05 | End: 2025-08-06 | Stop reason: HOSPADM

## 2025-08-05 RX ORDER — LISINOPRIL 5 MG/1
5 TABLET ORAL DAILY
Status: CANCELLED | OUTPATIENT
Start: 2025-08-06

## 2025-08-05 RX ADMIN — Medication 0.4 MG: at 18:22

## 2025-08-05 RX ADMIN — SODIUM CHLORIDE, PRESERVATIVE FREE 10 ML: 5 INJECTION INTRAVENOUS at 22:47

## 2025-08-05 RX ADMIN — ACETAMINOPHEN 650 MG: 325 TABLET ORAL at 22:47

## 2025-08-05 RX ADMIN — ATORVASTATIN CALCIUM 40 MG: 40 TABLET, FILM COATED ORAL at 22:47

## 2025-08-05 ASSESSMENT — LIFESTYLE VARIABLES
HOW OFTEN DO YOU HAVE A DRINK CONTAINING ALCOHOL: NEVER
HOW MANY STANDARD DRINKS CONTAINING ALCOHOL DO YOU HAVE ON A TYPICAL DAY: PATIENT DOES NOT DRINK

## 2025-08-05 ASSESSMENT — PAIN DESCRIPTION - ORIENTATION: ORIENTATION: MID

## 2025-08-05 ASSESSMENT — PAIN DESCRIPTION - PAIN TYPE: TYPE: ACUTE PAIN

## 2025-08-05 ASSESSMENT — PAIN SCALES - GENERAL
PAINLEVEL_OUTOF10: 1
PAINLEVEL_OUTOF10: 1

## 2025-08-05 ASSESSMENT — ENCOUNTER SYMPTOMS
EYE PAIN: 0
VOMITING: 0
NAUSEA: 0
ABDOMINAL PAIN: 0
SHORTNESS OF BREATH: 0
SORE THROAT: 0
COUGH: 0
BACK PAIN: 0
RHINORRHEA: 0
DIARRHEA: 0

## 2025-08-05 ASSESSMENT — PAIN DESCRIPTION - ONSET: ONSET: ON-GOING

## 2025-08-05 ASSESSMENT — PAIN - FUNCTIONAL ASSESSMENT: PAIN_FUNCTIONAL_ASSESSMENT: ACTIVITIES ARE NOT PREVENTED

## 2025-08-05 ASSESSMENT — PAIN DESCRIPTION - FREQUENCY: FREQUENCY: CONTINUOUS

## 2025-08-05 ASSESSMENT — PAIN DESCRIPTION - DESCRIPTORS: DESCRIPTORS: SHARP

## 2025-08-05 ASSESSMENT — PAIN DESCRIPTION - LOCATION: LOCATION: CHEST

## 2025-08-06 VITALS
DIASTOLIC BLOOD PRESSURE: 68 MMHG | RESPIRATION RATE: 16 BRPM | WEIGHT: 136 LBS | HEIGHT: 65 IN | TEMPERATURE: 97.7 F | SYSTOLIC BLOOD PRESSURE: 135 MMHG | BODY MASS INDEX: 22.66 KG/M2 | HEART RATE: 64 BPM | OXYGEN SATURATION: 86 %

## 2025-08-06 LAB
ANION GAP SERPL CALCULATED.3IONS-SCNC: 9 MMOL/L (ref 9–16)
BUN SERPL-MCNC: 14 MG/DL (ref 8–23)
CALCIUM SERPL-MCNC: 8.9 MG/DL (ref 8.8–10.2)
CHLORIDE SERPL-SCNC: 106 MMOL/L (ref 98–107)
CHOLEST SERPL-MCNC: 226 MG/DL (ref 0–199)
CHOLESTEROL/HDL RATIO: 4.5
CO2 SERPL-SCNC: 25 MMOL/L (ref 20–31)
CREAT SERPL-MCNC: 0.8 MG/DL (ref 0.5–0.9)
EKG ATRIAL RATE: 61 BPM
EKG ATRIAL RATE: 73 BPM
EKG P AXIS: 63 DEGREES
EKG P AXIS: 68 DEGREES
EKG P-R INTERVAL: 160 MS
EKG P-R INTERVAL: 190 MS
EKG Q-T INTERVAL: 402 MS
EKG Q-T INTERVAL: 434 MS
EKG QRS DURATION: 78 MS
EKG QRS DURATION: 88 MS
EKG QTC CALCULATION (BAZETT): 436 MS
EKG QTC CALCULATION (BAZETT): 442 MS
EKG R AXIS: 47 DEGREES
EKG R AXIS: 61 DEGREES
EKG T AXIS: 42 DEGREES
EKG T AXIS: 69 DEGREES
EKG VENTRICULAR RATE: 61 BPM
EKG VENTRICULAR RATE: 73 BPM
ERYTHROCYTE [DISTWIDTH] IN BLOOD BY AUTOMATED COUNT: 13.2 % (ref 11.8–14.4)
GFR, ESTIMATED: 87 ML/MIN/1.73M2
GLUCOSE SERPL-MCNC: 97 MG/DL (ref 82–115)
HCT VFR BLD AUTO: 36.6 % (ref 36.3–47.1)
HDLC SERPL-MCNC: 50 MG/DL
HGB BLD-MCNC: 12 G/DL (ref 11.9–15.1)
LDLC SERPL CALC-MCNC: 138 MG/DL (ref 0–100)
MAGNESIUM SERPL-MCNC: 2.2 MG/DL (ref 1.6–2.4)
MCH RBC QN AUTO: 29.1 PG (ref 25.2–33.5)
MCHC RBC AUTO-ENTMCNC: 32.8 G/DL (ref 28.4–34.8)
MCV RBC AUTO: 88.8 FL (ref 82.6–102.9)
NRBC BLD-RTO: 0 PER 100 WBC
PLATELET # BLD AUTO: 200 K/UL (ref 138–453)
PMV BLD AUTO: 10.5 FL (ref 8.1–13.5)
POTASSIUM SERPL-SCNC: 4 MMOL/L (ref 3.7–5.3)
RBC # BLD AUTO: 4.12 M/UL (ref 3.95–5.11)
SODIUM SERPL-SCNC: 139 MMOL/L (ref 136–145)
TRIGL SERPL-MCNC: 188 MG/DL
TROPONIN I SERPL HS-MCNC: 68 NG/L (ref 0–14)
TROPONIN I SERPL HS-MCNC: 88 NG/L (ref 0–14)
VLDLC SERPL CALC-MCNC: 38 MG/DL (ref 1–30)
WBC OTHER # BLD: 5.8 K/UL (ref 3.5–11.3)

## 2025-08-06 PROCEDURE — 99238 HOSP IP/OBS DSCHRG MGMT 30/<: CPT | Performed by: INTERNAL MEDICINE

## 2025-08-06 PROCEDURE — 85027 COMPLETE CBC AUTOMATED: CPT

## 2025-08-06 PROCEDURE — 2500000003 HC RX 250 WO HCPCS: Performed by: INTERNAL MEDICINE

## 2025-08-06 PROCEDURE — 84484 ASSAY OF TROPONIN QUANT: CPT

## 2025-08-06 PROCEDURE — 36415 COLL VENOUS BLD VENIPUNCTURE: CPT

## 2025-08-06 PROCEDURE — 2709999900 HC NON-CHARGEABLE SUPPLY: Performed by: INTERNAL MEDICINE

## 2025-08-06 PROCEDURE — 99152 MOD SED SAME PHYS/QHP 5/>YRS: CPT | Performed by: INTERNAL MEDICINE

## 2025-08-06 PROCEDURE — 6360000002 HC RX W HCPCS: Performed by: INTERNAL MEDICINE

## 2025-08-06 PROCEDURE — B2111ZZ FLUOROSCOPY OF MULTIPLE CORONARY ARTERIES USING LOW OSMOLAR CONTRAST: ICD-10-PCS | Performed by: INTERNAL MEDICINE

## 2025-08-06 PROCEDURE — 2500000003 HC RX 250 WO HCPCS: Performed by: NURSE PRACTITIONER

## 2025-08-06 PROCEDURE — 93005 ELECTROCARDIOGRAM TRACING: CPT | Performed by: NURSE PRACTITIONER

## 2025-08-06 PROCEDURE — 6360000002 HC RX W HCPCS: Performed by: NURSE PRACTITIONER

## 2025-08-06 PROCEDURE — 80061 LIPID PANEL: CPT

## 2025-08-06 PROCEDURE — 80048 BASIC METABOLIC PNL TOTAL CA: CPT

## 2025-08-06 PROCEDURE — 6360000004 HC RX CONTRAST MEDICATION: Performed by: INTERNAL MEDICINE

## 2025-08-06 PROCEDURE — 93458 L HRT ARTERY/VENTRICLE ANGIO: CPT | Performed by: INTERNAL MEDICINE

## 2025-08-06 PROCEDURE — 4A023N7 MEASUREMENT OF CARDIAC SAMPLING AND PRESSURE, LEFT HEART, PERCUTANEOUS APPROACH: ICD-10-PCS | Performed by: INTERNAL MEDICINE

## 2025-08-06 PROCEDURE — C1894 INTRO/SHEATH, NON-LASER: HCPCS | Performed by: INTERNAL MEDICINE

## 2025-08-06 PROCEDURE — 6370000000 HC RX 637 (ALT 250 FOR IP): Performed by: NURSE PRACTITIONER

## 2025-08-06 PROCEDURE — B2151ZZ FLUOROSCOPY OF LEFT HEART USING LOW OSMOLAR CONTRAST: ICD-10-PCS | Performed by: INTERNAL MEDICINE

## 2025-08-06 PROCEDURE — 2580000003 HC RX 258: Performed by: NURSE PRACTITIONER

## 2025-08-06 PROCEDURE — 94761 N-INVAS EAR/PLS OXIMETRY MLT: CPT

## 2025-08-06 PROCEDURE — 83735 ASSAY OF MAGNESIUM: CPT

## 2025-08-06 RX ORDER — ACETAMINOPHEN 160 MG
2000 TABLET,DISINTEGRATING ORAL DAILY
COMMUNITY

## 2025-08-06 RX ORDER — FERROUS SULFATE 325(65) MG
325 TABLET ORAL EVERY OTHER DAY
COMMUNITY

## 2025-08-06 RX ORDER — CARVEDILOL 3.12 MG/1
3.12 TABLET ORAL 2 TIMES DAILY WITH MEALS
COMMUNITY

## 2025-08-06 RX ORDER — ELECTROLYTES/DEXTROSE
1 SOLUTION, ORAL ORAL DAILY
COMMUNITY

## 2025-08-06 RX ORDER — CARVEDILOL 3.12 MG/1
3.12 TABLET ORAL 2 TIMES DAILY WITH MEALS
Qty: 60 TABLET | Refills: 3 | Status: SHIPPED | OUTPATIENT
Start: 2025-08-06

## 2025-08-06 RX ORDER — FENTANYL CITRATE 50 UG/ML
INJECTION, SOLUTION INTRAMUSCULAR; INTRAVENOUS PRN
Status: DISCONTINUED | OUTPATIENT
Start: 2025-08-06 | End: 2025-08-06 | Stop reason: HOSPADM

## 2025-08-06 RX ORDER — NITROGLYCERIN 20 MG/100ML
INJECTION INTRAVENOUS PRN
Status: DISCONTINUED | OUTPATIENT
Start: 2025-08-06 | End: 2025-08-06 | Stop reason: HOSPADM

## 2025-08-06 RX ORDER — ROSUVASTATIN CALCIUM 20 MG/1
20 TABLET, COATED ORAL DAILY
COMMUNITY

## 2025-08-06 RX ORDER — ENOXAPARIN SODIUM 100 MG/ML
1 INJECTION SUBCUTANEOUS 2 TIMES DAILY
Status: DISCONTINUED | OUTPATIENT
Start: 2025-08-06 | End: 2025-08-06 | Stop reason: HOSPADM

## 2025-08-06 RX ORDER — CARVEDILOL 3.12 MG/1
3.12 TABLET ORAL 2 TIMES DAILY WITH MEALS
Status: DISCONTINUED | OUTPATIENT
Start: 2025-08-06 | End: 2025-08-06 | Stop reason: HOSPADM

## 2025-08-06 RX ORDER — HEPARIN SODIUM 1000 [USP'U]/ML
INJECTION, SOLUTION INTRAVENOUS; SUBCUTANEOUS PRN
Status: DISCONTINUED | OUTPATIENT
Start: 2025-08-06 | End: 2025-08-06 | Stop reason: HOSPADM

## 2025-08-06 RX ORDER — VERAPAMIL HYDROCHLORIDE 2.5 MG/ML
INJECTION INTRAVENOUS PRN
Status: DISCONTINUED | OUTPATIENT
Start: 2025-08-06 | End: 2025-08-06 | Stop reason: HOSPADM

## 2025-08-06 RX ORDER — ENOXAPARIN SODIUM 100 MG/ML
1 INJECTION SUBCUTANEOUS DAILY
Status: DISCONTINUED | OUTPATIENT
Start: 2025-08-07 | End: 2025-08-06

## 2025-08-06 RX ORDER — MIDAZOLAM HYDROCHLORIDE 1 MG/ML
INJECTION, SOLUTION INTRAMUSCULAR; INTRAVENOUS PRN
Status: DISCONTINUED | OUTPATIENT
Start: 2025-08-06 | End: 2025-08-06 | Stop reason: HOSPADM

## 2025-08-06 RX ORDER — IOPAMIDOL 755 MG/ML
INJECTION, SOLUTION INTRAVASCULAR PRN
Status: DISCONTINUED | OUTPATIENT
Start: 2025-08-06 | End: 2025-08-06 | Stop reason: HOSPADM

## 2025-08-06 RX ORDER — ISOSORBIDE MONONITRATE 30 MG/1
30 TABLET, EXTENDED RELEASE ORAL DAILY
Qty: 30 TABLET | Refills: 3 | Status: SHIPPED | OUTPATIENT
Start: 2025-08-06

## 2025-08-06 RX ADMIN — SODIUM CHLORIDE, PRESERVATIVE FREE 10 ML: 5 INJECTION INTRAVENOUS at 09:29

## 2025-08-06 RX ADMIN — SODIUM CHLORIDE: 9 INJECTION, SOLUTION INTRAVENOUS at 14:00

## 2025-08-06 RX ADMIN — ASPIRIN 81 MG: 81 TABLET, CHEWABLE ORAL at 08:51

## 2025-08-06 RX ADMIN — ENOXAPARIN SODIUM 40 MG: 100 INJECTION SUBCUTANEOUS at 08:51

## 2025-08-06 ASSESSMENT — PAIN SCALES - GENERAL: PAINLEVEL_OUTOF10: 1

## 2025-08-06 ASSESSMENT — PAIN DESCRIPTION - LOCATION: LOCATION: CHEST

## 2025-08-06 ASSESSMENT — PAIN DESCRIPTION - ORIENTATION: ORIENTATION: MID

## 2025-08-06 ASSESSMENT — PAIN DESCRIPTION - DESCRIPTORS: DESCRIPTORS: SHARP

## (undated) DEVICE — Device

## (undated) DEVICE — BAND COMPR L24CM REG CLR PLAS HEMSTAT EXT HK AND LOOP RETEN

## (undated) DEVICE — CATHETER ANGIO 5FR L100CM GRY S STL NYL JL3.5 3 SEG BRAID L

## (undated) DEVICE — KIT INTRO 6FR L10CM MINI WIRE L45CM DIA0.021IN NDL 21GA ANT

## (undated) DEVICE — CATHETER ANGIO 5FR L100CM GRY S STL NYL JR4 3 SEG BRAID L